# Patient Record
Sex: MALE | Race: BLACK OR AFRICAN AMERICAN | NOT HISPANIC OR LATINO | Employment: STUDENT | URBAN - METROPOLITAN AREA
[De-identification: names, ages, dates, MRNs, and addresses within clinical notes are randomized per-mention and may not be internally consistent; named-entity substitution may affect disease eponyms.]

---

## 2019-12-17 ENCOUNTER — TELEPHONE (OUTPATIENT)
Dept: ORTHOPEDICS | Facility: CLINIC | Age: 19
End: 2019-12-17

## 2019-12-17 DIAGNOSIS — M25.561 PAIN IN BOTH KNEES, UNSPECIFIED CHRONICITY: Primary | ICD-10-CM

## 2019-12-17 DIAGNOSIS — M25.562 PAIN IN BOTH KNEES, UNSPECIFIED CHRONICITY: Primary | ICD-10-CM

## 2019-12-17 NOTE — TELEPHONE ENCOUNTER
1st attempt to contact patient regarding his appt that is scheduled for 12/18. Need to ask patient which ankle he is coming for and also that he needs xrays done. Patient did not answer. Unable to leave -RUTH

## 2019-12-18 ENCOUNTER — HOSPITAL ENCOUNTER (OUTPATIENT)
Dept: RADIOLOGY | Facility: HOSPITAL | Age: 19
Discharge: HOME | End: 2019-12-18
Attending: FAMILY MEDICINE
Payer: COMMERCIAL

## 2019-12-18 DIAGNOSIS — M25.561 PAIN IN BOTH KNEES, UNSPECIFIED CHRONICITY: ICD-10-CM

## 2019-12-18 DIAGNOSIS — M25.562 PAIN IN BOTH KNEES, UNSPECIFIED CHRONICITY: ICD-10-CM

## 2019-12-18 PROCEDURE — 73610 X-RAY EXAM OF ANKLE: CPT | Mod: 26,LT,, | Performed by: RADIOLOGY

## 2019-12-18 PROCEDURE — 73610 X-RAY EXAM OF ANKLE: CPT | Mod: 50,TC

## 2019-12-18 PROCEDURE — 73610 PR  X-RAY ANKLE 3+ VW: ICD-10-PCS | Mod: 26,RT,, | Performed by: RADIOLOGY

## 2019-12-18 PROCEDURE — 73610 X-RAY EXAM OF ANKLE: CPT | Mod: 26,RT,, | Performed by: RADIOLOGY

## 2019-12-23 ENCOUNTER — TELEPHONE (OUTPATIENT)
Dept: SPORTS MEDICINE | Facility: CLINIC | Age: 19
End: 2019-12-23

## 2019-12-23 ENCOUNTER — PATIENT MESSAGE (OUTPATIENT)
Dept: SPORTS MEDICINE | Facility: CLINIC | Age: 19
End: 2019-12-23

## 2019-12-23 NOTE — TELEPHONE ENCOUNTER
Attempted to call patient to reschedule 12-26-19 appt with Dr. Juan De La Garza, provider will not be in clinic. Left message for patient to return call, message sent via Portal also.

## 2019-12-24 ENCOUNTER — TELEPHONE (OUTPATIENT)
Dept: ORTHOPEDICS | Facility: CLINIC | Age: 19
End: 2019-12-24

## 2019-12-24 NOTE — TELEPHONE ENCOUNTER
Called pt father to reschedule pt apt due to provider being out of office. Pt is scheduled to 1/2. Pt father understood.

## 2020-01-02 ENCOUNTER — OFFICE VISIT (OUTPATIENT)
Dept: SPORTS MEDICINE | Facility: CLINIC | Age: 20
End: 2020-01-02
Payer: COMMERCIAL

## 2020-01-02 VITALS
SYSTOLIC BLOOD PRESSURE: 148 MMHG | DIASTOLIC BLOOD PRESSURE: 92 MMHG | HEIGHT: 76 IN | WEIGHT: 315 LBS | BODY MASS INDEX: 38.36 KG/M2 | HEART RATE: 102 BPM

## 2020-01-02 DIAGNOSIS — M25.571 RIGHT ANKLE PAIN, UNSPECIFIED CHRONICITY: Primary | ICD-10-CM

## 2020-01-02 DIAGNOSIS — S99.911A ANKLE INJURY, RIGHT, INITIAL ENCOUNTER: ICD-10-CM

## 2020-01-02 PROCEDURE — 99203 OFFICE O/P NEW LOW 30 MIN: CPT | Mod: S$GLB,,, | Performed by: ORTHOPAEDIC SURGERY

## 2020-01-02 PROCEDURE — 99203 PR OFFICE/OUTPT VISIT, NEW, LEVL III, 30-44 MIN: ICD-10-PCS | Mod: S$GLB,,, | Performed by: ORTHOPAEDIC SURGERY

## 2020-01-02 PROCEDURE — 99999 PR PBB SHADOW E&M-EST. PATIENT-LVL III: CPT | Mod: PBBFAC,,, | Performed by: ORTHOPAEDIC SURGERY

## 2020-01-02 PROCEDURE — 99213 OFFICE O/P EST LOW 20 MIN: CPT | Mod: PBBFAC | Performed by: ORTHOPAEDIC SURGERY

## 2020-01-02 PROCEDURE — 99999 PR PBB SHADOW E&M-EST. PATIENT-LVL III: ICD-10-PCS | Mod: PBBFAC,,, | Performed by: ORTHOPAEDIC SURGERY

## 2020-01-02 NOTE — PATIENT INSTRUCTIONS
· MRI right ankle  · Follow-up after MRI - needs to be before January 12    Thank you for choosing [facility] and Dr. Juan De La Garza for your orthopedic & sports medicine care. It is our goal to provide you with exceptional care that will help keep you healthy, active, and get you back in the game.    If you felt that you received exemplary care today, please consider leaving us feedback on Healthgrades at [email or web address].     Please do not hesitate to reach out to us via email, phone, or MyChart with any questions, concerns, or feedback.

## 2020-01-02 NOTE — PROGRESS NOTES
Patient ID: Mayito Quintana Jr.  YOB: 2000  MRN: [id number]    Chief Complaint: Pain of the Right Ankle    History of Present Illness: Mayito Quintana Jr. 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 Here for eval of right ankle injury that occurred in Sept.    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   Right ankle was injured on football field while blocking, then a pile during a game on September 14th, 2019      Ankle Pain    The pain is present in the right ankle. The current episode started more than 1 month ago. The injury was the result of a collision/contact and falling action while on the field (football field). The problem occurs intermittently. The problem has been gradually improving. The quality of the pain is described as sharp, shooting and throbbing. The pain is at a severity of 0/10. Associated symptoms include joint swelling. Pertinent negatives include no fever or numbness. The symptoms are aggravated by twisting, rotation, bearing weight, activity and exercise (cutting). He has tried OTC pain meds, cold and heat (taped ankle for remaining season) for the symptoms. The treatment provided moderate relief. Physical therapy was effective (PT at [facility]).      Past Medical History:   History reviewed. No pertinent past medical history.  Past Surgical History:   Procedure Laterality Date    HAND SURGERY      Dr. Jose Walton     History reviewed. No pertinent family history.  Social History     Socioeconomic History    Marital status: Single     Spouse name: Not on file    Number of children: Not on file    Years of education: Not on file    Highest education level: Not on file   Occupational History    Not on file   Social Needs    Financial resource strain: Not on file    Food insecurity:     Worry: Not on file     Inability: Not on file    Transportation needs:     Medical: Not on file     Non-medical:  Not on file   Tobacco Use    Smoking status: Never Smoker   Substance and Sexual Activity    Alcohol use: Not on file    Drug use: Not on file    Sexual activity: Not on file   Lifestyle    Physical activity:     Days per week: Not on file     Minutes per session: Not on file    Stress: Not on file   Relationships    Social connections:     Talks on phone: Not on file     Gets together: Not on file     Attends [employer] service: Not on file     Active member of club or organization: Not on file     Attends meetings of clubs or organizations: Not on file     Relationship status: Not on file   Other Topics Concern    Not on file   Social History Narrative    Not on file       Review of patient's allergies indicates:  Allergies not on file  Review of Systems   Constitution: Negative for chills and fever.   HENT: Negative for ear discharge and hearing loss.    Eyes: Negative for blurred vision and visual disturbance.   Cardiovascular: Negative for chest pain and leg swelling.   Respiratory: Negative for cough and shortness of breath.    Endocrine: Negative for polyuria.   Hematologic/Lymphatic: Negative for bleeding problem.   Skin: Negative for rash.   Musculoskeletal: Negative for back pain, joint pain, joint swelling, muscle cramps and muscle weakness.   Gastrointestinal: Negative for nausea and vomiting.   Genitourinary: Negative for hematuria.   Neurological: Negative for loss of balance, numbness and paresthesias.   Psychiatric/Behavioral: Negative for altered mental status.       Physical Exam:   Body mass index is 38.34 kg/m².  Vitals:    01/02/20 1338   BP: (!) 148/92   Pulse: 102        Ortho/SPM Exam  GENERAL: Well appearing, appropriate for stated age, no acute distress.  CARDIOVASCULAR: Pulses regular by peripheral palpation.  PULMONARY: Respirations are even and non-labored.  NEURO: Awake, alert, and oriented x 3.  PSYCH: Mood & affect are appropriate.  HEENT: Head is normocephalic and  atraumatic.  Left ankle: Range of motion within normal limits and painless. Intact motor and sensation. Good perfusion. No tenderness, gross deformity, gross instability, or skin lesions.  Right ankle: + pain with LER stress test, mild + squeeze test, ttp anterolateral ligamentous structures. Intact EHL, FHL, gastrocsoleus, and tibialis anterior. Sensation intact to light touch in superficial peroneal, deep peroneal, tibial, sural, and saphenous nerve distributions. Foot warm and well perfused with capillary refill of less than 2 seconds and palpable pedal pulses.    Imaging:    X-Ray Ankle Complete Bilateral  Narrative: EXAMINATION:  XR ANKLE COMPLETE 3 VIEW BILATERAL    CLINICAL HISTORY:  Pain in right knee    TECHNIQUE:  AP, lateral and oblique views of both ankles were performed.    COMPARISON:  None    FINDINGS:  Minimally displaced chip or avulsion fracture right medial malleolus.  Ankle mortise maintained bilaterally.  Bilateral plantar calcaneal spurs.  Minimal soft tissue swelling right ankle without significant soft tissue abnormality on the left.  Impression: Minimally displaced chip or avulsion fracture right medial malleolus.  See above.    Additional findings as above.    Electronically signed by: Rudy Sampson MD  Date:    12/18/2019  Time:    11:52    Relevant imaging results reviewed and interpreted by me, discussed with the patient and / or family today.     Other Tests:     No other tests performed today.    Assessment:  Mayito Quintana Jr. 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    Right ankle pain, unspecified chronicity Yes    Ankle injury, right, initial encounter         Plan:  · MRI right ankle to assess syndesmosis and articular cartilage  · Follow-up after MRI - needs to be before January 12   Treatment plan was developed with input from the patient/family, and they  expressed understanding and agreement with the plan. All questions were answered today.    Follow-up: after MRI or sooner if there are any problems between now and then.    Disclaimer: This note was prepared using a voice recognition system and is likely to have sound alike errors within the text.

## 2020-01-08 ENCOUNTER — TELEPHONE (OUTPATIENT)
Dept: RADIOLOGY | Facility: HOSPITAL | Age: 20
End: 2020-01-08